# Patient Record
Sex: FEMALE | Race: ASIAN | NOT HISPANIC OR LATINO | Employment: STUDENT | URBAN - METROPOLITAN AREA
[De-identification: names, ages, dates, MRNs, and addresses within clinical notes are randomized per-mention and may not be internally consistent; named-entity substitution may affect disease eponyms.]

---

## 2023-06-09 ENCOUNTER — OFFICE VISIT (OUTPATIENT)
Dept: URGENT CARE | Facility: URGENT CARE | Age: 23
End: 2023-06-09
Payer: COMMERCIAL

## 2023-06-09 VITALS
SYSTOLIC BLOOD PRESSURE: 134 MMHG | HEART RATE: 85 BPM | OXYGEN SATURATION: 100 % | DIASTOLIC BLOOD PRESSURE: 90 MMHG | RESPIRATION RATE: 16 BRPM | WEIGHT: 132 LBS | TEMPERATURE: 98.8 F

## 2023-06-09 DIAGNOSIS — L56.8 PHOTOTOXIC DERMATITIS: Primary | ICD-10-CM

## 2023-06-09 PROCEDURE — 99203 OFFICE O/P NEW LOW 30 MIN: CPT | Performed by: FAMILY MEDICINE

## 2023-06-09 RX ORDER — TRIAMCINOLONE ACETONIDE 5 MG/G
CREAM TOPICAL 2 TIMES DAILY
Qty: 30 G | Refills: 1 | Status: SHIPPED | OUTPATIENT
Start: 2023-06-09 | End: 2023-11-21

## 2023-06-09 NOTE — LETTER
June 9, 2023      Maddie Littlejohn  [id number] [facility] [address]        To Whom It May Concern:    Maddie Littlejohn  was seen on 06/09/23  .  Please excuse her  until her condition improves likely will be able to return to work on 6/12/2023.        Sincerely,        Veronica Harmon MD

## 2023-06-10 NOTE — PROGRESS NOTES
SUBJECTIVE:  Maddie Littlejohn is a 23 year old female who presents to the clinic today for a rash.  Onset of rash was 4 day(s) ago.   Rash is gradual onset.  Location of the rash: thighs.  Quality/symptoms of rash: itching and painful   Symptoms are moderate and rash seems to be not changing over the course of time.  Previous history of a similar rash? No  Recent exposure history: none known    Associated symptoms include: nothing.    No past medical history on file.  No current outpatient medications on file.     Social History     Tobacco Use     Smoking status: Never     Smokeless tobacco: Not on file   Vaping Use     Vaping status: Not on file   Substance Use Topics     Alcohol use: Not on file       ROS:  Review of systems negative except as stated above.    EXAM:   BP (!) 134/90   Pulse 85   Temp 98.8  F (37.1  C) (Tympanic)   Resp 16   Wt 59.9 kg (132 lb)   SpO2 100%   GENERAL: alert, no acute distress.  SKIN: Rash description:    Distribution: localized  Location: thigh and knee    Color: red,  Lesion type: maculopapular, blotchy with no other findings  GENERAL APPEARANCE: healthy, alert and no distress  NEURO: Normal strength and tone, sensory exam grossly normal,  normal speech and mentation    ASSESSMENT:  1. Phototoxic dermatitis  She had been out in the sun and was swimming no one else is affected   - triamcinolone (ARISTOCORT HP) 0.5 % external cream; Apply topically 2 times daily To thighs and knees  Dispense: 30 g; Refill: 1  - Adult Dermatology Referral; Future    Veronica Harmon M.D.

## 2023-06-10 NOTE — PATIENT INSTRUCTIONS
Please take either claritin (loratadine) 10 mg , allegra (fexofenadine) 180 mg , or zyrtec (cetirizine) 10mg   2 times per day   Apply the steroid cream 2 times per day for up to 2 weeks if not resolving see dermatology

## 2023-11-06 ENCOUNTER — ANESTHESIA (OUTPATIENT)
Dept: SURGERY | Facility: CLINIC | Age: 23
End: 2023-11-06
Payer: COMMERCIAL

## 2023-11-06 ENCOUNTER — HOSPITAL ENCOUNTER (OUTPATIENT)
Facility: CLINIC | Age: 23
Discharge: HOME | End: 2023-11-06
Attending: STUDENT IN AN ORGANIZED HEALTH CARE EDUCATION/TRAINING PROGRAM | Admitting: STUDENT IN AN ORGANIZED HEALTH CARE EDUCATION/TRAINING PROGRAM
Payer: COMMERCIAL

## 2023-11-06 ENCOUNTER — ANESTHESIA EVENT (OUTPATIENT)
Dept: SURGERY | Facility: CLINIC | Age: 23
End: 2023-11-06
Payer: COMMERCIAL

## 2023-11-06 ENCOUNTER — HOSPITAL ENCOUNTER (OUTPATIENT)
Facility: CLINIC | Age: 23
End: 2023-11-06
Attending: SURGERY | Admitting: SURGERY
Payer: COMMERCIAL

## 2023-11-06 ENCOUNTER — TELEPHONE (OUTPATIENT)
Dept: SURGERY | Facility: CLINIC | Age: 23
End: 2023-11-06

## 2023-11-06 ENCOUNTER — APPOINTMENT (OUTPATIENT)
Dept: CT IMAGING | Facility: CLINIC | Age: 23
End: 2023-11-06
Attending: STUDENT IN AN ORGANIZED HEALTH CARE EDUCATION/TRAINING PROGRAM
Payer: COMMERCIAL

## 2023-11-06 VITALS
SYSTOLIC BLOOD PRESSURE: 103 MMHG | HEART RATE: 112 BPM | HEIGHT: 58 IN | TEMPERATURE: 97.6 F | WEIGHT: 134 LBS | OXYGEN SATURATION: 97 % | RESPIRATION RATE: 16 BRPM | DIASTOLIC BLOOD PRESSURE: 64 MMHG | BODY MASS INDEX: 28.13 KG/M2

## 2023-11-06 DIAGNOSIS — K35.30 ACUTE APPENDICITIS WITH LOCALIZED PERITONITIS, WITHOUT PERFORATION, ABSCESS, OR GANGRENE: Primary | ICD-10-CM

## 2023-11-06 LAB
ALBUMIN SERPL BCG-MCNC: 4.5 G/DL (ref 3.5–5.2)
ALP SERPL-CCNC: 78 U/L (ref 35–104)
ALT SERPL W P-5'-P-CCNC: 25 U/L (ref 0–50)
ANION GAP SERPL CALCULATED.3IONS-SCNC: 17 MMOL/L (ref 7–15)
AST SERPL W P-5'-P-CCNC: 23 U/L (ref 0–45)
BASOPHILS # BLD AUTO: 0 10E3/UL (ref 0–0.2)
BASOPHILS NFR BLD AUTO: 0 %
BILIRUB SERPL-MCNC: 0.8 MG/DL
BUN SERPL-MCNC: 11.3 MG/DL (ref 6–20)
CALCIUM SERPL-MCNC: 9.7 MG/DL (ref 8.6–10)
CHLORIDE SERPL-SCNC: 99 MMOL/L (ref 98–107)
CREAT SERPL-MCNC: 0.57 MG/DL (ref 0.51–0.95)
DEPRECATED HCO3 PLAS-SCNC: 19 MMOL/L (ref 22–29)
EGFRCR SERPLBLD CKD-EPI 2021: >90 ML/MIN/1.73M2
EOSINOPHIL # BLD AUTO: 0 10E3/UL (ref 0–0.7)
EOSINOPHIL NFR BLD AUTO: 0 %
ERYTHROCYTE [DISTWIDTH] IN BLOOD BY AUTOMATED COUNT: 11.8 % (ref 10–15)
GLUCOSE SERPL-MCNC: 146 MG/DL (ref 70–99)
HCG SERPL QL: NEGATIVE
HCT VFR BLD AUTO: 40.9 % (ref 35–47)
HGB BLD-MCNC: 13.7 G/DL (ref 11.7–15.7)
HOLD SPECIMEN: NORMAL
IMM GRANULOCYTES # BLD: 0.1 10E3/UL
IMM GRANULOCYTES NFR BLD: 0 %
LACTATE SERPL-SCNC: 2.4 MMOL/L (ref 0.7–2)
LACTATE SERPL-SCNC: 2.6 MMOL/L (ref 0.7–2)
LIPASE SERPL-CCNC: 19 U/L (ref 13–60)
LYMPHOCYTES # BLD AUTO: 1.7 10E3/UL (ref 0.8–5.3)
LYMPHOCYTES NFR BLD AUTO: 10 %
MCH RBC QN AUTO: 30 PG (ref 26.5–33)
MCHC RBC AUTO-ENTMCNC: 33.5 G/DL (ref 31.5–36.5)
MCV RBC AUTO: 90 FL (ref 78–100)
MONOCYTES # BLD AUTO: 0.6 10E3/UL (ref 0–1.3)
MONOCYTES NFR BLD AUTO: 3 %
NEUTROPHILS # BLD AUTO: 14.6 10E3/UL (ref 1.6–8.3)
NEUTROPHILS NFR BLD AUTO: 87 %
NRBC # BLD AUTO: 0 10E3/UL
NRBC BLD AUTO-RTO: 0 /100
PLATELET # BLD AUTO: 244 10E3/UL (ref 150–450)
POTASSIUM SERPL-SCNC: 4 MMOL/L (ref 3.4–5.3)
PROT SERPL-MCNC: 7.7 G/DL (ref 6.4–8.3)
RADIOLOGIST FLAGS: ABNORMAL
RBC # BLD AUTO: 4.57 10E6/UL (ref 3.8–5.2)
SODIUM SERPL-SCNC: 135 MMOL/L (ref 135–145)
WBC # BLD AUTO: 17 10E3/UL (ref 4–11)

## 2023-11-06 PROCEDURE — 258N000003 HC RX IP 258 OP 636

## 2023-11-06 PROCEDURE — 74177 CT ABD & PELVIS W/CONTRAST: CPT

## 2023-11-06 PROCEDURE — 272N000001 HC OR GENERAL SUPPLY STERILE: Performed by: SURGERY

## 2023-11-06 PROCEDURE — 360N000076 HC SURGERY LEVEL 3, PER MIN: Performed by: SURGERY

## 2023-11-06 PROCEDURE — 36415 COLL VENOUS BLD VENIPUNCTURE: CPT | Performed by: FAMILY MEDICINE

## 2023-11-06 PROCEDURE — 80053 COMPREHEN METABOLIC PANEL: CPT | Performed by: STUDENT IN AN ORGANIZED HEALTH CARE EDUCATION/TRAINING PROGRAM

## 2023-11-06 PROCEDURE — 83690 ASSAY OF LIPASE: CPT | Performed by: STUDENT IN AN ORGANIZED HEALTH CARE EDUCATION/TRAINING PROGRAM

## 2023-11-06 PROCEDURE — 250N000009 HC RX 250: Performed by: SURGERY

## 2023-11-06 PROCEDURE — 710N000009 HC RECOVERY PHASE 1, LEVEL 1, PER MIN: Performed by: SURGERY

## 2023-11-06 PROCEDURE — 250N000011 HC RX IP 250 OP 636: Mod: JZ

## 2023-11-06 PROCEDURE — 36415 COLL VENOUS BLD VENIPUNCTURE: CPT | Performed by: STUDENT IN AN ORGANIZED HEALTH CARE EDUCATION/TRAINING PROGRAM

## 2023-11-06 PROCEDURE — 370N000017 HC ANESTHESIA TECHNICAL FEE, PER MIN: Performed by: SURGERY

## 2023-11-06 PROCEDURE — 258N000003 HC RX IP 258 OP 636: Performed by: STUDENT IN AN ORGANIZED HEALTH CARE EDUCATION/TRAINING PROGRAM

## 2023-11-06 PROCEDURE — 44970 LAPAROSCOPY APPENDECTOMY: CPT | Performed by: SURGERY

## 2023-11-06 PROCEDURE — 83605 ASSAY OF LACTIC ACID: CPT | Performed by: STUDENT IN AN ORGANIZED HEALTH CARE EDUCATION/TRAINING PROGRAM

## 2023-11-06 PROCEDURE — 85025 COMPLETE CBC W/AUTO DIFF WBC: CPT | Performed by: FAMILY MEDICINE

## 2023-11-06 PROCEDURE — 250N000009 HC RX 250

## 2023-11-06 PROCEDURE — 250N000009 HC RX 250: Performed by: STUDENT IN AN ORGANIZED HEALTH CARE EDUCATION/TRAINING PROGRAM

## 2023-11-06 PROCEDURE — 271N000001 HC OR GENERAL SUPPLY NON-STERILE: Performed by: SURGERY

## 2023-11-06 PROCEDURE — 710N000012 HC RECOVERY PHASE 2, PER MINUTE: Performed by: SURGERY

## 2023-11-06 PROCEDURE — 99285 EMERGENCY DEPT VISIT HI MDM: CPT | Mod: 25 | Performed by: STUDENT IN AN ORGANIZED HEALTH CARE EDUCATION/TRAINING PROGRAM

## 2023-11-06 PROCEDURE — 99204 OFFICE O/P NEW MOD 45 MIN: CPT | Mod: 57 | Performed by: SURGERY

## 2023-11-06 PROCEDURE — 88304 TISSUE EXAM BY PATHOLOGIST: CPT | Mod: 26 | Performed by: PATHOLOGY

## 2023-11-06 PROCEDURE — 250N000025 HC SEVOFLURANE, PER MIN: Performed by: SURGERY

## 2023-11-06 PROCEDURE — 88304 TISSUE EXAM BY PATHOLOGIST: CPT | Mod: TC | Performed by: SURGERY

## 2023-11-06 PROCEDURE — 84703 CHORIONIC GONADOTROPIN ASSAY: CPT | Performed by: STUDENT IN AN ORGANIZED HEALTH CARE EDUCATION/TRAINING PROGRAM

## 2023-11-06 PROCEDURE — 99285 EMERGENCY DEPT VISIT HI MDM: CPT | Performed by: STUDENT IN AN ORGANIZED HEALTH CARE EDUCATION/TRAINING PROGRAM

## 2023-11-06 PROCEDURE — 250N000011 HC RX IP 250 OP 636: Mod: JZ | Performed by: STUDENT IN AN ORGANIZED HEALTH CARE EDUCATION/TRAINING PROGRAM

## 2023-11-06 PROCEDURE — 250N000011 HC RX IP 250 OP 636: Performed by: STUDENT IN AN ORGANIZED HEALTH CARE EDUCATION/TRAINING PROGRAM

## 2023-11-06 RX ORDER — FENTANYL CITRATE 50 UG/ML
25 INJECTION, SOLUTION INTRAMUSCULAR; INTRAVENOUS EVERY 5 MIN PRN
Status: DISCONTINUED | OUTPATIENT
Start: 2023-11-06 | End: 2023-11-06 | Stop reason: HOSPADM

## 2023-11-06 RX ORDER — FENTANYL CITRATE 50 UG/ML
50 INJECTION, SOLUTION INTRAMUSCULAR; INTRAVENOUS EVERY 5 MIN PRN
Status: DISCONTINUED | OUTPATIENT
Start: 2023-11-06 | End: 2023-11-06 | Stop reason: HOSPADM

## 2023-11-06 RX ORDER — CEFAZOLIN SODIUM/WATER 2 G/20 ML
2 SYRINGE (ML) INTRAVENOUS SEE ADMIN INSTRUCTIONS
Status: DISCONTINUED | OUTPATIENT
Start: 2023-11-06 | End: 2023-11-09 | Stop reason: HOSPADM

## 2023-11-06 RX ORDER — CEFTRIAXONE 1 G/1
1 INJECTION, POWDER, FOR SOLUTION INTRAMUSCULAR; INTRAVENOUS ONCE
Status: COMPLETED | OUTPATIENT
Start: 2023-11-06 | End: 2023-11-06

## 2023-11-06 RX ORDER — ONDANSETRON 4 MG/1
4 TABLET, ORALLY DISINTEGRATING ORAL EVERY 30 MIN PRN
Status: DISCONTINUED | OUTPATIENT
Start: 2023-11-06 | End: 2023-11-06 | Stop reason: HOSPADM

## 2023-11-06 RX ORDER — ONDANSETRON 2 MG/ML
4 INJECTION INTRAMUSCULAR; INTRAVENOUS EVERY 30 MIN PRN
Status: DISCONTINUED | OUTPATIENT
Start: 2023-11-06 | End: 2023-11-06 | Stop reason: HOSPADM

## 2023-11-06 RX ORDER — SODIUM CHLORIDE 9 MG/ML
INJECTION, SOLUTION INTRAVENOUS CONTINUOUS PRN
Status: DISCONTINUED | OUTPATIENT
Start: 2023-11-06 | End: 2023-11-06

## 2023-11-06 RX ORDER — HYDROMORPHONE HCL IN WATER/PF 6 MG/30 ML
0.2 PATIENT CONTROLLED ANALGESIA SYRINGE INTRAVENOUS EVERY 5 MIN PRN
Status: DISCONTINUED | OUTPATIENT
Start: 2023-11-06 | End: 2023-11-06 | Stop reason: HOSPADM

## 2023-11-06 RX ORDER — PROPOFOL 10 MG/ML
INJECTION, EMULSION INTRAVENOUS PRN
Status: DISCONTINUED | OUTPATIENT
Start: 2023-11-06 | End: 2023-11-06

## 2023-11-06 RX ORDER — IOPAMIDOL 755 MG/ML
66 INJECTION, SOLUTION INTRAVASCULAR ONCE
Status: COMPLETED | OUTPATIENT
Start: 2023-11-06 | End: 2023-11-06

## 2023-11-06 RX ORDER — DEXAMETHASONE SODIUM PHOSPHATE 4 MG/ML
INJECTION, SOLUTION INTRA-ARTICULAR; INTRALESIONAL; INTRAMUSCULAR; INTRAVENOUS; SOFT TISSUE PRN
Status: DISCONTINUED | OUTPATIENT
Start: 2023-11-06 | End: 2023-11-06

## 2023-11-06 RX ORDER — METRONIDAZOLE 500 MG/100ML
500 INJECTION, SOLUTION INTRAVENOUS EVERY 12 HOURS
Status: DISCONTINUED | OUTPATIENT
Start: 2023-11-06 | End: 2023-11-09 | Stop reason: HOSPADM

## 2023-11-06 RX ORDER — BUPIVACAINE HYDROCHLORIDE AND EPINEPHRINE 5; 5 MG/ML; UG/ML
INJECTION, SOLUTION PERINEURAL PRN
Status: DISCONTINUED | OUTPATIENT
Start: 2023-11-06 | End: 2023-11-06 | Stop reason: HOSPADM

## 2023-11-06 RX ORDER — FENTANYL CITRATE 50 UG/ML
INJECTION, SOLUTION INTRAMUSCULAR; INTRAVENOUS PRN
Status: DISCONTINUED | OUTPATIENT
Start: 2023-11-06 | End: 2023-11-06

## 2023-11-06 RX ORDER — HYDROMORPHONE HCL IN WATER/PF 6 MG/30 ML
0.4 PATIENT CONTROLLED ANALGESIA SYRINGE INTRAVENOUS EVERY 5 MIN PRN
Status: DISCONTINUED | OUTPATIENT
Start: 2023-11-06 | End: 2023-11-06 | Stop reason: HOSPADM

## 2023-11-06 RX ORDER — OXYCODONE HYDROCHLORIDE 5 MG/1
5 TABLET ORAL EVERY 6 HOURS PRN
Qty: 10 TABLET | Refills: 0 | Status: SHIPPED | OUTPATIENT
Start: 2023-11-06

## 2023-11-06 RX ORDER — KETOROLAC TROMETHAMINE 15 MG/ML
15 INJECTION, SOLUTION INTRAMUSCULAR; INTRAVENOUS ONCE
Status: COMPLETED | OUTPATIENT
Start: 2023-11-06 | End: 2023-11-06

## 2023-11-06 RX ORDER — CEFAZOLIN SODIUM/WATER 2 G/20 ML
2 SYRINGE (ML) INTRAVENOUS
Status: DISCONTINUED | OUTPATIENT
Start: 2023-11-06 | End: 2023-11-09 | Stop reason: HOSPADM

## 2023-11-06 RX ORDER — SODIUM CHLORIDE, SODIUM LACTATE, POTASSIUM CHLORIDE, CALCIUM CHLORIDE 600; 310; 30; 20 MG/100ML; MG/100ML; MG/100ML; MG/100ML
INJECTION, SOLUTION INTRAVENOUS CONTINUOUS
Status: DISCONTINUED | OUTPATIENT
Start: 2023-11-06 | End: 2023-11-06 | Stop reason: HOSPADM

## 2023-11-06 RX ADMIN — IOPAMIDOL 66 ML: 755 INJECTION, SOLUTION INTRAVENOUS at 01:46

## 2023-11-06 RX ADMIN — PHENYLEPHRINE HYDROCHLORIDE 100 MCG: 10 INJECTION INTRAVENOUS at 03:03

## 2023-11-06 RX ADMIN — SODIUM CHLORIDE: 0.9 INJECTION, SOLUTION INTRAVENOUS at 02:49

## 2023-11-06 RX ADMIN — PROPOFOL 200 MG: 10 INJECTION, EMULSION INTRAVENOUS at 02:55

## 2023-11-06 RX ADMIN — KETOROLAC TROMETHAMINE 15 MG: 15 INJECTION, SOLUTION INTRAMUSCULAR; INTRAVENOUS at 01:34

## 2023-11-06 RX ADMIN — FENTANYL CITRATE 50 MCG: 50 INJECTION INTRAMUSCULAR; INTRAVENOUS at 03:01

## 2023-11-06 RX ADMIN — SUGAMMADEX 200 MG: 100 INJECTION, SOLUTION INTRAVENOUS at 03:39

## 2023-11-06 RX ADMIN — DEXAMETHASONE SODIUM PHOSPHATE 4 MG: 4 INJECTION, SOLUTION INTRA-ARTICULAR; INTRALESIONAL; INTRAMUSCULAR; INTRAVENOUS; SOFT TISSUE at 02:58

## 2023-11-06 RX ADMIN — FENTANYL CITRATE 50 MCG: 50 INJECTION INTRAMUSCULAR; INTRAVENOUS at 03:19

## 2023-11-06 RX ADMIN — MIDAZOLAM 2 MG: 1 INJECTION INTRAMUSCULAR; INTRAVENOUS at 02:54

## 2023-11-06 RX ADMIN — SODIUM CHLORIDE: 0.9 INJECTION, SOLUTION INTRAVENOUS at 03:12

## 2023-11-06 RX ADMIN — SODIUM CHLORIDE 1000 ML: 9 INJECTION, SOLUTION INTRAVENOUS at 01:34

## 2023-11-06 RX ADMIN — ONDANSETRON 4 MG: 2 INJECTION INTRAMUSCULAR; INTRAVENOUS at 03:39

## 2023-11-06 RX ADMIN — FENTANYL CITRATE 100 MCG: 50 INJECTION INTRAMUSCULAR; INTRAVENOUS at 02:55

## 2023-11-06 RX ADMIN — CEFTRIAXONE SODIUM 1 G: 1 INJECTION, POWDER, FOR SOLUTION INTRAMUSCULAR; INTRAVENOUS at 02:30

## 2023-11-06 RX ADMIN — ROCURONIUM BROMIDE 50 MG: 50 INJECTION, SOLUTION INTRAVENOUS at 02:57

## 2023-11-06 RX ADMIN — SODIUM CHLORIDE 56 ML: 9 INJECTION, SOLUTION INTRAVENOUS at 01:47

## 2023-11-06 RX ADMIN — FENTANYL CITRATE 50 MCG: 50 INJECTION INTRAMUSCULAR; INTRAVENOUS at 03:40

## 2023-11-06 ASSESSMENT — ACTIVITIES OF DAILY LIVING (ADL)
ADLS_ACUITY_SCORE: 35

## 2023-11-06 NOTE — ANESTHESIA POSTPROCEDURE EVALUATION
Patient: Maddie Littlejohn    Procedure: Procedure(s):  APPENDECTOMY, LAPAROSCOPIC       Anesthesia Type:  General    Note:  Disposition: Outpatient   Postop Pain Control: Uneventful            Sign Out: Well controlled pain   PONV: No   Neuro/Psych: Uneventful            Sign Out: Acceptable/Baseline neuro status   Airway/Respiratory: Uneventful            Sign Out: Acceptable/Baseline resp. status   CV/Hemodynamics: Uneventful            Sign Out: Acceptable CV status; No obvious hypovolemia; No obvious fluid overload   Other NRE:    DID A NON-ROUTINE EVENT OCCUR?            Last vitals:  Vitals Value Taken Time   /55 11/06/23 0415   Temp 36.4  C (97.6  F) 11/06/23 0400   Pulse 105 11/06/23 0428   Resp 18 11/06/23 0428   SpO2 98 % 11/06/23 0428   Vitals shown include unfiled device data.    Electronically Signed By: JUDITH Barr CRNA  November 6, 2023  4:29 AM

## 2023-11-06 NOTE — ED PROVIDER NOTES
"  History     Chief Complaint   Patient presents with    Abdominal Pain     HPI  Maddie Littlejohn is a 23 year old female who has no significant medical history presents to the emergency department for evaluation of abdominal pain.  Patient states that she has had on and off abdominal pain for the last couple of weeks, however today the pain became much worse.  She states that she was having some pain in her upper abdomen, but now it is localized to the right lower quadrant.  She denies nausea or vomiting.  Denies diarrhea, melena, hematochezia.  No urinary symptoms.  No fever or chills.  States she has been eating and drinking well.  She denies vaginal discharge or bleeding.  Denies chance of pregnancy.  Alcohol, tobacco or drugs.    Allergies:  No Known Allergies    Problem List:    There are no problems to display for this patient.       Past Medical History:    No past medical history on file.    Past Surgical History:    No past surgical history on file.    Family History:    No family history on file.    Social History:  Marital Status:  Single [1]  Social History     Tobacco Use    Smoking status: Never        Medications:    No current outpatient medications on file.        Review of Systems  See HPI  Physical Exam   BP: 100/50  Pulse: 101  Temp: 98.4  F (36.9  C)  Resp: 20  Height: 147.3 cm (4' 10\")  Weight: 60.8 kg (134 lb)  SpO2: 97 %      Physical Exam  /68   Pulse 114   Temp 98.4  F (36.9  C) (Tympanic)   Resp 20   Ht 1.473 m (4' 10\")   Wt 60.8 kg (134 lb)   SpO2 100%   BMI 28.01 kg/m    General: alert, interactive, in no apparent distress  Head: atraumatic  Nose: no rhinorrhea or epistaxis  Ears: no external auditory canal discharge or bleeding.    Eyes: Sclera nonicteric. Conjunctiva noninjected. PERRL, EOMI  Mouth: no tonsillar erythema, edema, or exudate.  Dry mucous membranes  Neck: supple, moving spontaneously no midline cervical tenderness  Lungs: No increased work of breathing.  Clear " to auscultation bilaterally.  CV: Tachycardic, peripheral pulses palpable and symmetric  Abdomen: soft, tender to palpation in the right lower quadrant.  No rebound or guarding.  No CVA tenderness bilaterally.  Extremities: Warm and well-perfused.  Skin: no rash or diaphoresis  Neuro: CN II-XII grossly intact, strength 5/5 in UE and LEs bilaterally, sensation intact to light touch in UE and LEs bilaterally;     ED Course                 Procedures           Critical Care time:  none         Results for orders placed or performed during the hospital encounter of 11/06/23 (from the past 24 hour(s))   [place] Draw *Canceled*    Narrative    The following orders were created for panel order [place] Draw.  Procedure                               Abnormality         Status                     ---------                               -----------         ------                       Please view results for these tests on the individual orders.   CBC with platelets, differential    Narrative    The following orders were created for panel order CBC with platelets, differential.  Procedure                               Abnormality         Status                     ---------                               -----------         ------                     CBC with platelets and d...[[id number]]  Abnormal            Final result                 Please view results for these tests on the individual orders.   Comprehensive metabolic panel   Result Value Ref Range    Sodium 135 135 - 145 mmol/L    Potassium 4.0 3.4 - 5.3 mmol/L    Carbon Dioxide (CO2) 19 (L) 22 - 29 mmol/L    Anion Gap 17 (H) 7 - 15 mmol/L    Urea Nitrogen 11.3 6.0 - 20.0 mg/dL    Creatinine 0.57 0.51 - 0.95 mg/dL    GFR Estimate >90 >60 mL/min/1.73m2    Calcium 9.7 8.6 - 10.0 mg/dL    Chloride 99 98 - 107 mmol/L    Glucose 146 (H) 70 - 99 mg/dL    Alkaline Phosphatase 78 35 - 104 U/L    AST 23 0 - 45 U/L    ALT 25 0 - 50 U/L    Protein Total 7.7 6.4 - 8.3 g/dL     Albumin 4.5 3.5 - 5.2 g/dL    Bilirubin Total 0.8 <=1.2 mg/dL   HCG qualitative pregnancy (blood)   Result Value Ref Range    hCG Serum Qualitative Negative Negative   CBC with platelets and differential   Result Value Ref Range    WBC Count 17.0 (H) 4.0 - 11.0 10e3/uL    RBC Count 4.57 3.80 - 5.20 10e6/uL    Hemoglobin 13.7 11.7 - 15.7 g/dL    Hematocrit 40.9 35.0 - 47.0 %    MCV 90 78 - 100 fL    MCH 30.0 26.5 - 33.0 pg    MCHC 33.5 31.5 - 36.5 g/dL    RDW 11.8 10.0 - 15.0 %    Platelet Count 244 150 - 450 10e3/uL    % Neutrophils 87 %    % Lymphocytes 10 %    % Monocytes 3 %    % Eosinophils 0 %    % Basophils 0 %    % Immature Granulocytes 0 %    NRBCs per 100 WBC 0 <1 /100    Absolute Neutrophils 14.6 (H) 1.6 - 8.3 10e3/uL    Absolute Lymphocytes 1.7 0.8 - 5.3 10e3/uL    Absolute Monocytes 0.6 0.0 - 1.3 10e3/uL    Absolute Eosinophils 0.0 0.0 - 0.7 10e3/uL    Absolute Basophils 0.0 0.0 - 0.2 10e3/uL    Absolute Immature Granulocytes 0.1 <=0.4 10e3/uL    Absolute NRBCs 0.0 10e3/uL   Lipase   Result Value Ref Range    Lipase 19 13 - 60 U/L   Lactic acid whole blood   Result Value Ref Range    Lactic Acid 2.4 (H) 0.7 - 2.0 mmol/L   CT Abdomen Pelvis w Contrast   Result Value Ref Range    Radiologist flags Appendicitis (AA)     Narrative    EXAM: CT ABDOMEN PELVIS W CONTRAST  LOCATION: [facility]  DATE: 11/6/2023    INDICATION: Right Lower quadrant pain  COMPARISON: None.  TECHNIQUE: CT scan of the abdomen and pelvis was performed following injection of IV contrast. Multiplanar reformats were obtained. Dose reduction techniques were used.  CONTRAST: 66 mL Isovue 370    FINDINGS:   LOWER CHEST: Normal.    HEPATOBILIARY: Normal.    PANCREAS: Normal.    SPLEEN: Normal.    ADRENAL GLANDS: Normal.    KIDNEYS/BLADDER: Normal.    BOWEL: The appendix is fluid-filled and mildly dilated, measuring 10 mm on image 140 of series 5. The appendiceal wall is hyperenhancing. There is mild adjacent edema.  No free air or drainable collection. Remainder of the GI tract is normal.    LYMPH NODES: Normal.    VASCULATURE: Unremarkable.    PELVIC ORGANS: Bilateral ovarian follicles. Cervical nabothian cyst.    MUSCULOSKELETAL: Normal.      Impression    IMPRESSION:   1.  Acute appendicitis.      [Critical Result: Appendicitis]    Finding was identified on 11/6/2023 2:10 AM CST.     1.  Dr. Ernst was contacted by me on 11/6/2023 2:17 AM CST and verbalized understanding of the critical result.        Medications   metroNIDAZOLE (FLAGYL) infusion 500 mg ( Intravenous Automatically Held 11/9/23 1420)   sodium chloride 0.9% BOLUS 1,000 mL (0 mLs Intravenous ED/Periop/Clinic Infusing on Admission/transfer 11/6/23 0247)   ketorolac (TORADOL) injection 15 mg (15 mg Intravenous $Given 11/6/23 0134)   iopamidol (ISOVUE-370) solution 66 mL (66 mLs Intravenous $Given 11/6/23 0146)   sodium chloride 0.9 % bag 500mL for CT scan flush use (56 mLs Intravenous $Given 11/6/23 0147)   cefTRIAXone (ROCEPHIN) 1 g vial to attach to  mL bag for ADULTS or NS 50 mL bag for PEDS (0 g Intravenous ED/Periop/Clinic Infusing on Admission/transfer 11/6/23 0247)       Assessments & Plan (with Medical Decision Making)     I have reviewed the nursing notes.    I have reviewed the findings, diagnosis, plan and need for follow up with the patient.    Medical Decision Making  Maddie Littlejohn is a 23 year old female who has no significant medical history presents to the emergency department for evaluation of abdominal pain.  Vital signs notable for mild tachycardia, otherwise afebrile and reassuring.  Patient is well-appearing on exam, but she has dry mucous membranes and tenderness in the right lower quadrant.  Lactate came back at 2.4.  A liter of IV fluids initiated.  CBC came back with leukocytosis of 17, otherwise normal.  Lipase is negative.  CMP demonstrates mild acidosis with an anion gap of 17 and bicarb of 19.  CT demonstrates acute appendicitis  without perforation or abscess.  Toradol was given for pain.  I discussed the case with Dr. Harris who was already here in person for another consult.  He will take the patient to the OR now for appendectomy.  Ceftriaxone and Flagyl given.  Patient taken to the OR prior to recheck lactate.        Current Discharge Medication List          Final diagnoses:   Acute appendicitis with localized peritonitis, without perforation, abscess, or gangrene       11/6/2023   [facility] EMERGENCY DEPT       Damien Ernst MD  11/06/23 0303

## 2023-11-06 NOTE — ANESTHESIA PREPROCEDURE EVALUATION
"Anesthesia Pre-Procedure Evaluation    Patient: Maddie Littlejohn   MRN: [id number] : 2000        Procedure : Procedure(s):  APPENDECTOMY, LAPAROSCOPIC          No past medical history on file.   No past surgical history on file.   No Known Allergies   Social History     Tobacco Use    Smoking status: Never    Smokeless tobacco: Not on file   Substance Use Topics    Alcohol use: Not on file      Wt Readings from Last 1 Encounters:   23 60.8 kg (134 lb)        Anesthesia Evaluation            ROS/MED HX  ENT/Pulmonary:  - neg pulmonary ROS     Neurologic:  - neg neurologic ROS     Cardiovascular:  - neg cardiovascular ROS     METS/Exercise Tolerance: >4 METS    Hematologic:  - neg hematologic  ROS     Musculoskeletal:  - neg musculoskeletal ROS     GI/Hepatic: Comment: Appendicitis, RLQ pain      Renal/Genitourinary:  - neg Renal ROS     Endo:  - neg endo ROS     Psychiatric/Substance Use:  - neg psychiatric ROS     Infectious Disease:  - neg infectious disease ROS     Malignancy:  - neg malignancy ROS     Other:  - neg other ROS          Physical Exam    Airway        Mallampati: II   TM distance: > 3 FB   Neck ROM: full   Mouth opening: > 3 cm    Respiratory Devices and Support  Comment: Not on oxygen currently       Dental  no notable dental history         Cardiovascular   cardiovascular exam normal          Pulmonary   pulmonary exam normal                OUTSIDE LABS:  CBC:   Lab Results   Component Value Date    WBC 17.0 (H) 2023    HGB 13.7 2023    HCT 40.9 2023     2023     BMP:   Lab Results   Component Value Date     2023    POTASSIUM 4.0 2023    CHLORIDE 99 2023    CO2 19 (L) 2023    BUN 11.3 2023    CR 0.57 2023     (H) 2023     COAGS: No results found for: \"PTT\", \"INR\", \"FIBR\"  POC:   Lab Results   Component Value Date    HCGS Negative 2023     HEPATIC:   Lab Results   Component Value Date    ALBUMIN 4.5 " 11/06/2023    PROTTOTAL 7.7 11/06/2023    ALT 25 11/06/2023    AST 23 11/06/2023    ALKPHOS 78 11/06/2023    BILITOTAL 0.8 11/06/2023     OTHER:   Lab Results   Component Value Date    LACT 2.4 (H) 11/06/2023    CAROLYN 9.7 11/06/2023    LIPASE 19 11/06/2023       Anesthesia Plan    ASA Status:  2, emergent       Anesthesia Type: General.     - Airway: ETT   Induction: Intravenous, Propofol.   Maintenance: TIVA.        Consents    Anesthesia Plan(s) and associated risks, benefits, and realistic alternatives discussed. Questions answered and patient/representative(s) expressed understanding.     - Discussed: Risks, Benefits and Alternatives for BOTH SEDATION and the PROCEDURE were discussed     - Discussed with:  Patient            Postoperative Care    Pain management: IV analgesics, Oral pain medications, Multi-modal analgesia.   PONV prophylaxis: Ondansetron (or other 5HT-3), Dexamethasone or Solumedrol     Comments:    Other Comments: Patient aware of plan, what to expect, and potential risks. Timeout was performed before bringing the patient back to OR, and once again, patient was asked if they had any questions            JUDITH Barr CRNA

## 2023-11-06 NOTE — TELEPHONE ENCOUNTER
Type of surgery: APPENDECTOMY, LAPAROSCOPIC (N/A)   Location of surgery: [facility]  Date and time of surgery: 11-6-23  Surgeon: Kimberly  Pre-Op Appt Date:   Post-Op Appt Date:    Packet sent out:   Pre-cert/Authorization completed:    Date:

## 2023-11-06 NOTE — ANESTHESIA PROCEDURE NOTES
Airway       Patient location during procedure: OR       Procedure Start/Stop Times: 11/6/2023 2:56 AM and 11/6/2023 2:58 AM  Staff -        CRNA: Krish Mora APRN CRNA       Performed By: CRNA  Consent for Airway        Urgency: elective  Indications and Patient Condition       Indications for airway management: med-procedural and airway protection         Mask difficulty assessment: 1 - vent by mask    Final Airway Details       Final airway type: endotracheal airway       Successful airway: ETT - single  Endotracheal Airway Details        ETT size (mm): 6.5       Cuffed: yes       Successful intubation technique: video laryngoscopy       VL Blade Size: Awad 3       Grade View of Cords: 1       Adjucts: stylet       Position: Center       Measured from: gums/teeth       Secured at (cm): 20       Bite block used: None    Post intubation assessment        Placement verified by: capnometry        Number of attempts at approach: 1       Secured with: plastic tape       Ease of procedure: easy       Dentition: Intact    Medication(s) Administered   Medication Administration Time: 11/6/2023 2:56 AM

## 2023-11-06 NOTE — ED TRIAGE NOTES
RLQ abd pain      Triage Assessment (Adult)       Row Name 11/06/23 0023          Triage Assessment    Airway WDL WDL        Respiratory WDL    Respiratory WDL WDL        Skin Circulation/Temperature WDL    Skin Circulation/Temperature WDL WDL        Peripheral/Neurovascular WDL    Peripheral Neurovascular WDL WDL        Cognitive/Neuro/Behavioral WDL    Cognitive/Neuro/Behavioral WDL WDL

## 2023-11-06 NOTE — ANESTHESIA CARE TRANSFER NOTE
Patient: Maddie Littlejohn    Procedure: Procedure(s):  APPENDECTOMY, LAPAROSCOPIC       Diagnosis: Acute appendicitis with localized peritonitis, without perforation, abscess, or gangrene [K35.30]  Diagnosis Additional Information: No value filed.    Anesthesia Type:   General     Note:    Oropharynx: oropharynx clear of all foreign objects  Level of Consciousness: awake      Independent Airway: airway patency satisfactory and stable  Dentition: dentition unchanged  Vital Signs Stable: post-procedure vital signs reviewed and stable  Report to RN Given: handoff report given  Patient transferred to: ICU (where PACU is on weekends)    ICU Handoff: Call for PAUSE to initiate/utilize ICU HANDOFF, Identified Patient, Identified Responsible Provider, Reviewed the Pertinent Medical History, Discussed Surgical Course, Reviewed Intra-OP Anesthesia Management and Issues during Anesthesia, Set Expectations for Post Procedure Period and Allowed Opportunity for Questions and Acknowledgement of Understanding      Vitals:  Vitals Value Taken Time   /55 11/06/23 0415   Temp 36.4  C (97.6  F) 11/06/23 0400   Pulse 106 11/06/23 0427   Resp 18 11/06/23 0427   SpO2 100 % 11/06/23 0427   Vitals shown include unfiled device data.    Electronically Signed By: JUDITH Barr CRNA  November 6, 2023  4:28 AM   No

## 2023-11-06 NOTE — OP NOTE
Date of Service: 11/6/2023     STAFF SURGEON:  Jaswinder Harris MD     ASSISTANT:  None.     PREOPERATIVE DIAGNOSIS:  Acute appendicitis.     POSTOPERATIVE DIAGNOSIS:  Acute appendicitis.     NAME OF PROCEDURE:  Laparoscopic appendectomy.     INDICATIONS FOR PROCEDURE:  The patient is a 23-year-old female with right lower quadrant pain who presented to the emergency room with tachycardia, leukocytosis and elevated lactate who had CT scan confirming acute appendicitis without abscess or perforation.  I offered appendectomy.  Risks, benefits, alternatives discussed and consent obtained.     TYPE OF ANESTHESIA:  General.     COMPLICATIONS:  None.     ESTIMATED BLOOD LOSS: 5 mL.     DRAINS:  None.     SPECIMENS:  Appendix.     IMPLANTS:  None.     OPERATIVE FINDINGS:  The patient's appendix appeared hyperemic with some exudate but no signs of perforation.  There was some murky fluid in the pelvis, small amount.     PROCEDURE DETAIL:  Following consent, the patient was brought from the preoperative holding area to the operating suite and laid in supine position and underwent general anesthesia with endotracheal intubation without difficulty.  The abdomen was prepped and draped in the usual fashion and then a timeout procedure was performed.  The patient received a dose of preoperative antibiotics, had sequentials for DVT prophylaxis.  Following the timeout, I made  an approximately 2 cm infraumbilical incision, elevated the abdominal fascia then between two Kochers and divided with a curved Worley scissor and peritoneal access obtained.  I placed two 0 Vicryl stay sutures on either side of the fascial defect and inserted the Aman cannula.  This was secured and attached to gas insufflation and pneumoperitoneum achieved without difficulty.  I then inserted a 5 mm 30-degree scope in the abdomen.  Brief inspection around the abdomen, no other pathologies were noted.  I then placed additional 5 mm working ports, one suprapubic  in midline and the other in the left lower quadrant.  The patient was then  positioned Trendelenburg and rotated to the left.  I then grasped and elevated the appendix and used a Maryland grasper to dissect a window around the base through the mesoappendix.  The base of the appendix was divided with a single firing of a blue load 45 mm laparoscopic stapler.  The mesoappendix was then divided with a single firing of white load 45 mm laparoscopic stapler.  The appendix was freed, it was placed in an EndoCatch bag and removed from the abdomen and passed off for pathology.  Inspection of the staple lines showed that they were intact.  No bleeding.  I irrigated the area until clear including in the pelvis.  The working ports were removed under direct vision, followed by removal of the Aman and desufflation of the abdomen.  The fascial defect closed using my previously placed stay sutures and an additional Vicryl figure-of-eight placed between.  Skin was closed using 4-0 Monocryl.  I injected 0.5% Marcaine with epinephrine 1:200,000 along the incisions for local anesthetic and incisions were then covered with Dermabond. Final counts complete. The patient tolerated the procedure well and was discharged from the operating room in stable condition.           Jaswinder Harris MD

## 2023-11-06 NOTE — CONSULTS
Patient seen in consultation for acute appendicitis by Damien Ernst MD [facility]    HPI:  Patient is a 23 year old female  with complaints of right lower quadrant abdominal pain  The patient noticed the symptoms about 1 month ago.  Current episode of pain x2 days.  Over the last month she would get some right lower quadrant abdominal pain that would last a few days before subsiding.  Longest seem to be for 5 days.  This would occur about every 2 weeks so she has had a few episodes of this.  This current flare felt to be the same type of pain in same location however was more severe.  Associated with some nausea, headache, backache and chills.  Denies vomiting, diarrhea.  Has had some decreased appetite when the pain was going on.  Last had food yesterday and last oral fluids 5 or 6 hours ago.  nothing makes the episode better.      Review Of Systems    Skin: negative  Ears/Nose/Throat: negative  Respiratory: No shortness of breath, dyspnea on exertion, cough, or hemoptysis  Cardiovascular: negative  Gastrointestinal: as above  Genitourinary: negative  Musculoskeletal: negative  Neurologic: headaches  Hematologic/Lymphatic/Immunologic: negative  Endocrine: negative      No past medical history on file.    No past surgical history on file.    Social History     Socioeconomic History    Marital status: Single     Spouse name: Not on file    Number of children: Not on file    Years of education: Not on file    Highest education level: Not on file   Occupational History    Not on file   Tobacco Use    Smoking status: Never    Smokeless tobacco: Not on file   Substance and Sexual Activity    Alcohol use: Not on file    Drug use: Not on file    Sexual activity: Not on file   Other Topics Concern    Not on file   Social History Narrative    Not on file     Social Determinants of Health     Financial Resource Strain: Not on file   Food Insecurity: Not on file   Transportation Needs: Not on file   Physical Activity: Not on file  "  Stress: Not on file   Social Connections: Not on file   Interpersonal Safety: Not on file   Housing Stability: Not on file       Current Outpatient Medications   Medication Sig Dispense Refill    triamcinolone (ARISTOCORT HP) 0.5 % external cream Apply topically 2 times daily To thighs and knees 30 g 1       Medications and history reviewed    Physical exam:  Vitals: BP 92/59   Pulse 107   Temp 98.4  F (36.9  C) (Tympanic)   Resp 20   Ht 1.473 m (4' 10\")   Wt 60.8 kg (134 lb)   SpO2 100%   BMI 28.01 kg/m    BMI= Body mass index is 28.01 kg/m .    Constitutional: healthy, alert, and no distress  Head: Normocephalic. No masses, lesions, tenderness or abnormalities  Cardiovascular: positive findings: tachycardia  Respiratory: negative, Percussion normal. Good diaphragmatic excursion. Lungs clear  Gastrointestinal: Abdomen soft, mildly tender right lower quadrant.  No guarding or rebound.  Positive Rovsing's.  : Deferred  Musculoskeletal: extremities normal- no gross deformities noted, gait normal, and normal muscle tone  Skin: no suspicious lesions or rashes  Psychiatric: mentation appears normal and affect normal/bright    Labs show:   Latest Reference Range & Units 11/06/23 00:37 11/06/23 01:32   Sodium 135 - 145 mmol/L 135    Potassium 3.4 - 5.3 mmol/L 4.0    Chloride 98 - 107 mmol/L 99    Carbon Dioxide (CO2) 22 - 29 mmol/L 19 (L)    Urea Nitrogen 6.0 - 20.0 mg/dL 11.3    Creatinine 0.51 - 0.95 mg/dL 0.57    GFR Estimate >60 mL/min/1.73m2 >90    Calcium 8.6 - 10.0 mg/dL 9.7    Anion Gap 7 - 15 mmol/L 17 (H)    Albumin 3.5 - 5.2 g/dL 4.5    Protein Total 6.4 - 8.3 g/dL 7.7    Alkaline Phosphatase 35 - 104 U/L 78    ALT 0 - 50 U/L 25    AST 0 - 45 U/L 23    Bilirubin Total <=1.2 mg/dL 0.8    Glucose 70 - 99 mg/dL 146 (H)    HCG Qualitative Serum Negative  Negative    Lactic Acid 0.7 - 2.0 mmol/L  2.4 (H)   Lipase 13 - 60 U/L 19    WBC 4.0 - 11.0 10e3/uL 17.0 (H)    Hemoglobin 11.7 - 15.7 g/dL 13.7  "   Hematocrit 35.0 - 47.0 % 40.9    Platelet Count 150 - 450 10e3/uL 244    RBC Count 3.80 - 5.20 10e6/uL 4.57    MCV 78 - 100 fL 90    MCH 26.5 - 33.0 pg 30.0    MCHC 31.5 - 36.5 g/dL 33.5    RDW 10.0 - 15.0 % 11.8    % Neutrophils % 87    % Lymphocytes % 10    % Monocytes % 3    % Eosinophils % 0    % Basophils % 0    Absolute Basophils 0.0 - 0.2 10e3/uL 0.0    Absolute Eosinophils 0.0 - 0.7 10e3/uL 0.0    Absolute Immature Granulocytes <=0.4 10e3/uL 0.1    Absolute Lymphocytes 0.8 - 5.3 10e3/uL 1.7    Absolute Monocytes 0.0 - 1.3 10e3/uL 0.6    % Immature Granulocytes % 0    Absolute Neutrophils 1.6 - 8.3 10e3/uL 14.6 (H)    Absolute NRBCs 10e3/uL 0.0    NRBCs per 100 WBC <1 /100 0    (L): Data is abnormally low  (H): Data is abnormally high    Imaging shows: Personally reviewed  EXAM: CT ABDOMEN PELVIS W CONTRAST  LOCATION: [facility]  DATE: 11/6/2023     INDICATION: Right Lower quadrant pain  COMPARISON: None.  TECHNIQUE: CT scan of the abdomen and pelvis was performed following injection of IV contrast. Multiplanar reformats were obtained. Dose reduction techniques were used.  CONTRAST: 66 mL Isovue 370     FINDINGS:   LOWER CHEST: Normal.     HEPATOBILIARY: Normal.     PANCREAS: Normal.     SPLEEN: Normal.     ADRENAL GLANDS: Normal.     KIDNEYS/BLADDER: Normal.     BOWEL: The appendix is fluid-filled and mildly dilated, measuring 10 mm on image 140 of series 5. The appendiceal wall is hyperenhancing. There is mild adjacent edema. No free air or drainable collection. Remainder of the GI tract is normal.     LYMPH NODES: Normal.     VASCULATURE: Unremarkable.     PELVIC ORGANS: Bilateral ovarian follicles. Cervical nabothian cyst.     MUSCULOSKELETAL: Normal.                                                                      IMPRESSION:   1.  Acute appendicitis.    Assessment:     ICD-10-CM    1. Acute appendicitis with localized peritonitis, without perforation, abscess, or  gangrene  K35.30 Case Request: APPENDECTOMY, LAPAROSCOPIC     Case Request: APPENDECTOMY, LAPAROSCOPIC        Plan: CT confirmed acute appendicitis, appears early and uncomplicated without abscess or perforation.  No appendicolith.  Briefly discussed surgical and antibiotic treatment options for appendicitis.  Antibiotic therapy can be effective though does have risk of recurrence of appendicitis up to 30% and would need initiation of the antibiotics and some monitoring to ensure improvement.  Surgery can be accomplished more expediently and though has risks of infection, bleeding, intra-abdominal injury, conversion to open, anesthesia effects overall these risks would be low in a young healthy patient with uncomplicated appendicitis.  Plan in this case would be able to be discharged home following recovery and thus complicating factors discovered at time of operation.  Patient like to go forward with appendectomy.  Consent signed.    Jaswinder Harris MD

## 2023-11-07 ENCOUNTER — TELEPHONE (OUTPATIENT)
Dept: SURGERY | Facility: CLINIC | Age: 23
End: 2023-11-07
Payer: COMMERCIAL

## 2023-11-07 LAB
PATH REPORT.COMMENTS IMP SPEC: NORMAL
PATH REPORT.COMMENTS IMP SPEC: NORMAL
PATH REPORT.FINAL DX SPEC: NORMAL
PATH REPORT.GROSS SPEC: NORMAL
PATH REPORT.MICROSCOPIC SPEC OTHER STN: NORMAL
PATH REPORT.RELEVANT HX SPEC: NORMAL
PHOTO IMAGE: NORMAL

## 2023-11-07 ASSESSMENT — ACTIVITIES OF DAILY LIVING (ADL)
ADLS_ACUITY_SCORE: 35

## 2023-11-07 NOTE — TELEPHONE ENCOUNTER
----- Message from Jaswinder Harris MD sent at 11/6/2023  3:52 AM CST -----  Post op appendectomy follow up for this pt at [facility] in 2 weeks thanks

## 2023-11-08 ASSESSMENT — ACTIVITIES OF DAILY LIVING (ADL)
ADLS_ACUITY_SCORE: 35

## 2023-11-09 ASSESSMENT — ACTIVITIES OF DAILY LIVING (ADL)
ADLS_ACUITY_SCORE: 35

## 2023-11-21 ENCOUNTER — OFFICE VISIT (OUTPATIENT)
Dept: SURGERY | Facility: CLINIC | Age: 23
End: 2023-11-21
Payer: COMMERCIAL

## 2023-11-21 VITALS
RESPIRATION RATE: 16 BRPM | SYSTOLIC BLOOD PRESSURE: 119 MMHG | TEMPERATURE: 98.6 F | WEIGHT: 145 LBS | HEART RATE: 83 BPM | BODY MASS INDEX: 30.31 KG/M2 | DIASTOLIC BLOOD PRESSURE: 84 MMHG | OXYGEN SATURATION: 98 %

## 2023-11-21 DIAGNOSIS — K35.30 ACUTE APPENDICITIS WITH LOCALIZED PERITONITIS, WITHOUT PERFORATION, ABSCESS, OR GANGRENE: Primary | ICD-10-CM

## 2023-11-21 PROCEDURE — 99024 POSTOP FOLLOW-UP VISIT: CPT | Performed by: PHYSICIAN ASSISTANT

## 2023-11-21 NOTE — NURSING NOTE
Chief Complaint   Patient presents with    Surgical Followup     APPENDECTOMY, LAPAROSCOPIC       Vitals:    11/21/23 0934   Pulse: 83   Resp: 16   SpO2: 98%   Weight: 65.8 kg (145 lb)     Wt Readings from Last 1 Encounters:   11/21/23 65.8 kg (145 lb)   Josy Mckay MA

## 2023-11-21 NOTE — PROGRESS NOTES
Surgery Post-op Note  [facility]  [address] [place] [address]  T: [phone]  F: [phone]    Subjective:     Maddie Littlejohn presents to the clinic after undergoing laparoscopic appendectomy on 11/6/2023.  Patient is here for follow up. The patient reports no incisional pain.  Patient is currently tolerating regular diet.  Patient states bowel habits  are  soft. Patient denies significant nausea or vomiting.        Objective:     Vitals:   /84   Pulse 83   Temp 98.6  F (37  C) (Tympanic)   Resp 16   Wt 65.8 kg (145 lb)   SpO2 98%   BMI 30.31 kg/m     General Appearance:    Maddie is a well-appearing  female who is in no acute distress.   Cardiac:    Clear to auscultation    Pulmonary:   Regular rate, rhythm   Abdomen:    Soft ,nontender. Nondistended. No palpable masses. No rebound or guarding, no peritoneal signs.    Incision: The incision site is healing  well. There are no signs of cellulitis or drainage.        Labs/Imaging:     No new labs or imaging       Pathology:     Final Diagnosis  Appendix, appendectomy:  - Acute appendicitis with periappendicitis.  - Negative for malignancy.  Electronically signed by Madan Beavers MD on 11/7/2023 at 1:51 PM     Assessment:     Ms.Kathy RONEY Littlejohn is status post laparoscopic appendectomy, doing well .        Plan:     1. The patient is instructed to call the office if there is any increasing incisional pain, swelling, redness, drainage, or any other problems.   2. Follow up as needed  3. Okay to return to work and activities unrestricted after 2 weeks  3. Avoid heavy lifting, swimming or strenuous exercise until 6 weeks

## 2023-11-21 NOTE — LETTER
11/21/2023         RE: Maddie Littlejohn  [id number] [address] [place] [address]        Dear Colleague,    Thank you for referring your patient, Maddie Littlejohn, to the [facility]. Please see a copy of my visit note below.    Surgery Post-op Note  [facility]  [address] [place] [address]  T: [phone]  F: [phone]    Subjective:     Maddie Littlejohn presents to the clinic after undergoing laparoscopic appendectomy on 11/6/2023.  Patient is here for follow up. The patient reports no incisional pain.  Patient is currently tolerating regular diet.  Patient states bowel habits  are  soft. Patient denies significant nausea or vomiting.        Objective:     Vitals:   /84   Pulse 83   Temp 98.6  F (37  C) (Tympanic)   Resp 16   Wt 65.8 kg (145 lb)   SpO2 98%   BMI 30.31 kg/m     General Appearance:    Maddie is a well-appearing  female who is in no acute distress.   Cardiac:    Clear to auscultation    Pulmonary:   Regular rate, rhythm   Abdomen:    Soft ,nontender. Nondistended. No palpable masses. No rebound or guarding, no peritoneal signs.    Incision: The incision site is healing  well. There are no signs of cellulitis or drainage.        Labs/Imaging:     No new labs or imaging       Pathology:     Final Diagnosis  Appendix, appendectomy:  - Acute appendicitis with periappendicitis.  - Negative for malignancy.  Electronically signed by Madan Beavers MD on 11/7/2023 at 1:51 PM     Assessment:     Ms.Kathy RONEY Littlejohn is status post laparoscopic appendectomy, doing well .        Plan:     1. The patient is instructed to call the office if there is any increasing incisional pain, swelling, redness, drainage, or any other problems.   2. Follow up as needed  3. Okay to return to work and activities unrestricted after 2 weeks  3. Avoid heavy lifting, swimming or strenuous exercise until 6 weeks      Again, thank you for allowing me to participate  in the care of your patient.        Sincerely,        CHANELLE COLE PA-C

## 2024-07-28 ENCOUNTER — HEALTH MAINTENANCE LETTER (OUTPATIENT)
Age: 24
End: 2024-07-28

## 2025-08-10 ENCOUNTER — HEALTH MAINTENANCE LETTER (OUTPATIENT)
Age: 25
End: 2025-08-10

## (undated) DEVICE — ENDO POUCH UNIV RETRIEVAL SYSTEM INZII 10MM CD001

## (undated) DEVICE — SUCTION IRRIGATION STRYKFLOW II W/TIP DISP 250-070-520

## (undated) DEVICE — STOCKING SLEEVE COMPRESSION CALF LG

## (undated) DEVICE — STPL RELOAD REG TISSUE ECHELON 45 X 3.6MM BLUE GST45B

## (undated) DEVICE — GOWN XLG DISP 9545

## (undated) DEVICE — ENDO TROCAR FIRST ENTRY KII FIOS Z-THRD 05X100MM CTF03

## (undated) DEVICE — ENDO TROCAR BLUNT TIP KII BALLOON 12X100MM C0R47

## (undated) DEVICE — ESU HOLSTER PLASTIC DISP E2400

## (undated) DEVICE — SU VICRYL 0 UR-6 27" J603H

## (undated) DEVICE — GLOVE BIOGEL PI ULTRATOUCH G SZ 7.5 42175

## (undated) DEVICE — GLOVE BIOGEL PI MICRO INDICATOR UNDERGLOVE SZ 7.5 48975

## (undated) DEVICE — SYSTEM LAPAROVUE VISIBILITY LAPVUE10

## (undated) DEVICE — SU MONOCRYL 4-0 PS-2 18" UND Y496G

## (undated) DEVICE — ESU ELEC CLEANCOAT LAP FLAT L-HOOK 36CM E3774-36C

## (undated) DEVICE — STPL POWERED ECHELON 45MM PSEE45A

## (undated) DEVICE — DRAPE POUCH INSTRUMENT 3 POCKET 1018L

## (undated) DEVICE — ESU PENCIL W/COATED BLADE E2450H

## (undated) DEVICE — PREP CHLORAPREP 26ML TINTED ORANGE  260815

## (undated) DEVICE — DRAPE TIBURON GENERAL ENDOSCOPY 9458

## (undated) DEVICE — Device

## (undated) DEVICE — ENDO TROCAR SLEEVE KII Z-THREADED 05X100MM CTS02

## (undated) DEVICE — SU DERMABOND ADVANCED .7ML DNX12

## (undated) RX ORDER — BUPIVACAINE HYDROCHLORIDE AND EPINEPHRINE 5; 5 MG/ML; UG/ML
INJECTION, SOLUTION EPIDURAL; INTRACAUDAL; PERINEURAL
Status: DISPENSED
Start: 2023-11-06

## (undated) RX ORDER — ONDANSETRON 2 MG/ML
INJECTION INTRAMUSCULAR; INTRAVENOUS
Status: DISPENSED
Start: 2023-11-06

## (undated) RX ORDER — PROPOFOL 10 MG/ML
INJECTION, EMULSION INTRAVENOUS
Status: DISPENSED
Start: 2023-11-06

## (undated) RX ORDER — DEXAMETHASONE SODIUM PHOSPHATE 4 MG/ML
INJECTION, SOLUTION INTRA-ARTICULAR; INTRALESIONAL; INTRAMUSCULAR; INTRAVENOUS; SOFT TISSUE
Status: DISPENSED
Start: 2023-11-06

## (undated) RX ORDER — FENTANYL CITRATE 50 UG/ML
INJECTION, SOLUTION INTRAMUSCULAR; INTRAVENOUS
Status: DISPENSED
Start: 2023-11-06

## (undated) RX ORDER — CEFAZOLIN SODIUM/WATER 2 G/20 ML
SYRINGE (ML) INTRAVENOUS
Status: DISPENSED
Start: 2023-11-06